# Patient Record
Sex: MALE | URBAN - METROPOLITAN AREA
[De-identification: names, ages, dates, MRNs, and addresses within clinical notes are randomized per-mention and may not be internally consistent; named-entity substitution may affect disease eponyms.]

---

## 2020-08-13 ENCOUNTER — IMPORTED ENCOUNTER (OUTPATIENT)
Dept: URBAN - METROPOLITAN AREA CLINIC 9 | Facility: CLINIC | Age: 55
End: 2020-08-13

## 2021-10-16 ASSESSMENT — TONOMETRY
OD_IOP_MMHG: 21
OS_IOP_MMHG: 20

## 2021-12-02 NOTE — PATIENT DISCUSSION
Cataract surgery has been performed in the first eye and activities of daily living are still impaired. The patient would like to proceed with cataract surgery in the second eye as scheduled. The patient elects Basic IOL OS, goal of -2.00.

## 2022-03-24 NOTE — PATIENT DISCUSSION
Monitor. 37 y/o male c/o body pain. pt reports history of SCC. Pt reports body pain is related to sickle cell disease. Pt denies CP or SOB. Pt speaks clear, MAEx4, ambulates steady, unlabored breathing. Abd soft nt nd. SKin dry warm.

## 2022-03-24 NOTE — PATIENT DISCUSSION
D/C alphagan for one week. Once the eye is back to normal then restart the alphagan to test for a repeat allergic response.